# Patient Record
Sex: FEMALE | Race: WHITE | URBAN - NONMETROPOLITAN AREA
[De-identification: names, ages, dates, MRNs, and addresses within clinical notes are randomized per-mention and may not be internally consistent; named-entity substitution may affect disease eponyms.]

---

## 2017-07-27 ENCOUNTER — OFFICE VISIT - GICH (OUTPATIENT)
Dept: FAMILY MEDICINE | Facility: OTHER | Age: 37
End: 2017-07-27

## 2017-07-27 ENCOUNTER — HISTORY (OUTPATIENT)
Dept: FAMILY MEDICINE | Facility: OTHER | Age: 37
End: 2017-07-27

## 2017-07-27 DIAGNOSIS — R39.9 UNSPECIFIED SYMPTOMS AND SIGNS INVOLVING THE GENITOURINARY SYSTEM: ICD-10-CM

## 2017-07-27 LAB
BACTERIA URINE: NORMAL BACTERIA/HPF
BILIRUB UR QL: NEGATIVE
CLARITY, URINE: CLEAR CLARITY
COLOR UR: YELLOW COLOR
EPITHELIAL CELLS: NORMAL EPI/HPF
GLUCOSE URINE: NEGATIVE MG/DL
KETONES UR QL: NEGATIVE MG/DL
LEUKOCYTE ESTERASE URINE: ABNORMAL
NITRITE UR QL STRIP: NEGATIVE
OCCULT BLOOD,URINE - HISTORICAL: ABNORMAL
PH UR: 6.5 [PH]
PROTEIN QUALITATIVE,URINE - HISTORICAL: NEGATIVE MG/DL
RBC - HISTORICAL: NORMAL /HPF
SP GR UR STRIP: <=1.005
UROBILINOGEN,QUALITATIVE - HISTORICAL: NORMAL EU/DL
WBC - HISTORICAL: NORMAL /HPF

## 2017-07-29 LAB
CULTURE - HISTORICAL: ABNORMAL
CULTURE - HISTORICAL: ABNORMAL
SUSCEPTIBILITY RESULT - HISTORICAL: ABNORMAL

## 2017-12-28 NOTE — PROGRESS NOTES
Patient Information     Patient Name MRMahogany Kaye [id number] Female 1980      Progress Notes by Lita Fontaine MD at 2017 11:45 AM     Author:  Lita Fontaine MD Service:  (none) Author Type:  Physician     Filed:  2017 12:37 PM Encounter Date:  2017 Status:  Signed     :  Lita Fontaine MD (Physician)            SUBJECTIVE: Mahogany Montiel is a 36 y.o. female who complains of urinary frequency, urgency and dysuria x 1 day and was worse last night, not as bad this morning but still frequency. Denies fever or chills or flank pain. No gross hematuria.  No history of previous UTI and actually has never had one. Had EMB earlier this week on 2017 and then traveled from home in [facility] here yesterday. EMB done for intermenstrual bleeding and was fine(she got a call yesterday).She is currently at the end of menses.     Social History     Social History        Marital status:       Spouse name: N/A     Number of children:  N/A     Years of education:  N/A     Occupational History      Not on file.     Social History Main Topics       Smoking status: Never Smoker     Smokeless tobacco: Never Used     Alcohol use Not on file     Drug use: Not on file     Sexual activity: Not on file     Other Topics  Concern     Not on file      Social History Narrative     Family has a cabin near [place].    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     Has one son and one daughter.               OBJECTIVE:  /74  Pulse 80  Wt 92.1 kg (203 lb)  LMP 2017 (Approximate)     Appears well, in no apparent distress.  Results for orders placed or performed in visit on 17      URINALYSIS W REFLEX MICROSCOPIC IF POSITIVE      Result  Value Ref Range    COLOR                     Yellow Yellow Color    CLARITY                   Clear Clear Clarity    SPECIFIC GRAVITY,URINE    <=1.005 (A) 1.010, 1.015, 1.020, 1.025                    PH,URINE                   6.5 6.0, 7.0, 8.0, 5.5, 6.5, 7.5, 8.5                    UROBILINOGEN,QUALITATIVE  Normal Normal EU/dl    PROTEIN, URINE Negative Negative mg/dL    GLUCOSE, URINE Negative Negative mg/dL    KETONES,URINE             Negative Negative mg/dL    BILIRUBIN,URINE           Negative Negative                    OCCULT BLOOD,URINE        Large (A) Negative                    NITRITE                   Negative Negative                    LEUKOCYTE ESTERASE        Moderate (A) Negative                   URINALYSIS MICROSCOPIC      Result  Value Ref Range    RBC 0-2 0-2, None Seen /HPF    WBC 3-5 0-2, 3-5, None Seen /HPF    BACTERIA                  Few None Seen, Rare, Occasional, Few Bacteria/HPF    EPITHELIAL CELLS          Few None Seen, Few Epi/HPF     I have personally reviewed the labs listed above.  Urine culture pending.    ASSESSMENT: UTI uncomplicated without evidence of pyelonephritis    PLAN: Treatment per orders -prescriptions for Septra DS and Pyridium for 3 days sent to pharmacy pending culture results and she will be informed of UC results.  Also push fluids. Call or return to clinic prn if these symptoms worsen or fail to improve as anticipated.  Lita Fontaine MD  12:36 PM 7/27/2017

## 2017-12-28 NOTE — PATIENT INSTRUCTIONS
Patient Information     Patient Name MRMahogany Kaye [id number] Female 1980      Patient Instructions by Lita Fontaine MD at 2017 11:47 AM     Author:  Lita Fontaine MD Service:  (none) Author Type:  Physician     Filed:  2017 11:47 AM Encounter Date:  2017 Status:  Signed     :  Lita Fontaine MD (Physician)               Index Hungarian   Bladder Infection: Brief Version   ________________________________________________________________________  KEY POINTS    The bladder is the part of your body that stores urine. When bacteria get into the bladder, it can get infected.    Your provider will give you antibiotics to treat the infection.    Drink lots of water and take your medicine for as long as your healthcare provider prescribes, even when you feel better.  ________________________________________________________________________  What is a bladder infection?   The bladder is the part of your body that stores urine. When bacteria get into the bladder, it can get infected.  What is the cause?  A bladder infection happens when bacteria from the skin get into the bladder.    Bacteria can spread to the bladder from the rectal area or vagina.    Sometimes the bladder gets infected when something is blocking the flow of urine. For example, in men the problem can be caused by an enlarged prostate gland. In pregnant women pressure from the baby might cause the problem.  Women get bladder infections more often than men.  What are the symptoms?     You may feel the need to urinate a lot.    You may feel a burning or stinging when you urinate.    You may have cramps in your lower belly or back.    Your urine may be cloudy and smell bad.    Your urine may look pink or red.    You may have a fever or chills.  How is it treated?   If tests by your healthcare provider show that you have a bladder infection, your provider will prescribe antibiotics. You may also need  pain medicine.  How can I take care of myself?     Take the antibiotic medicine for as long as your healthcare provider prescribes, even when you feel better.    Drink more water than you usually do.    Make sure you know when you should come back for a checkup.    Call your provider if your symptoms aren t better in 2 days, or if you have worse fever or pain.  How can I help prevent bladder infection?   Urinate often during the day. You should also urinate after you have sex.  If you are a woman, it is important to:     Keep the area around your vagina clean.    Wipe from front to back after you go to the bathroom.    Gently wash the area around your vagina when you bathe or shower.    Wear cotton underwear and use pantyhose with cotton crotches.    Avoid tight clothing. Wear loose pants.    Take wet bathing suits off right away.  Talk to your healthcare provider if you have bladder infections often. You may need tests to find out why. Your provider may prescribe medicine that helps prevent bladder infections.  If you are a man, remember to:    Always wash your penis when you bathe or shower. If you are not circumcised, gently pull back the foreskin and wash the tip of the penis when you bathe.  Developed by [employer].  Adult Advisor 2016.3 published by [employer].  Last modified: 2015-04-29  Last reviewed: 2015-04-28  This content is reviewed periodically and is subject to change as new health information becomes available. The information is intended to inform and educate and is not a replacement for medical evaluation, advice, diagnosis or treatment by a healthcare professional.  References   Adult Advisor 2016.3 Index    Copyright   2016 [employer], a division of [employer] All rights reserved.

## 2018-01-27 VITALS — DIASTOLIC BLOOD PRESSURE: 74 MMHG | HEART RATE: 80 BPM | SYSTOLIC BLOOD PRESSURE: 120 MMHG | WEIGHT: 203 LBS

## 2018-03-05 ENCOUNTER — DOCUMENTATION ONLY (OUTPATIENT)
Dept: FAMILY MEDICINE | Facility: OTHER | Age: 38
End: 2018-03-05

## 2018-03-05 RX ORDER — SULFAMETHOXAZOLE/TRIMETHOPRIM 800-160 MG
1 TABLET ORAL 2 TIMES DAILY
COMMUNITY
Start: 2017-07-27